# Patient Record
Sex: MALE | Race: BLACK OR AFRICAN AMERICAN | NOT HISPANIC OR LATINO | URBAN - METROPOLITAN AREA
[De-identification: names, ages, dates, MRNs, and addresses within clinical notes are randomized per-mention and may not be internally consistent; named-entity substitution may affect disease eponyms.]

---
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
## 2025-06-30 NOTE — ED PROVIDER NOTE - ENMT NEGATIVE STATEMENT, MLM
EGD INSTRUCTIONS     Re: [facility]   [address] [place] [address]    Provider: Howie Vidales MD    Your exam is scheduled as an outpatient procedure on:     Day: Tuesday    Date: August 12, 2025    Arrival Time: TO BE DETERMINED (You will receive a confirmation message 3 days before your appointment, if you do not receive a message or have questions, visit the patient portal for details.). Be aware your procedure time is subject to change.)     You will be receiving sedation for your procedure and MUST have an adult over 18 to drive you home and be with you after procedure.     Location: [address] [address]- Enter through the main entrance.           If you are diabetic: taking oral: Take all oral meds the day before the procedure. HOLD oral meds the day of the procedure.         7 days before: Review your instructions. (Instructions can also be found on [employer] under the letters tab under communication)      3 days before:Stop taking all anti-inflammatory medicines. These include: Advil, Aleve, Naprosyn, (Tylenol is okay to take)    1 day before:   Eat normal diet throughout the day   At MIDNIGHT start a strict clear liquid diet    A clear liquid diet can include: Apple juice, white grape juice, and white cranberry juice; Beef or chicken broths that are clear - no noodles, vegetables, rice, etc.Tea and coffee without milk; -Soda pop, Gatorade, Yonatan-Aid and various -Jell-O Flavors (any color except red or purple)  •Avoid: juices with pulp, milk, cream, solid food, alcohol, Gum, and hard candy.    -Remove ALL jewelry and piercings (rings, necklaces, all body piercings, etc) prior to arrival for procedure.      • Take your medications; levothyroxine (Synthroid) 100 MCG tablet, olmesartan (BENICAR) 20 MG tablet with a sip of water 4 hours prior to your arrival time. STOP ALL LIQUIDS INCLUDING WATER AT THIS TIME.                 Ears: no ear pain and no hearing problems. Nose: no nasal congestion and no nasal drainage. Mouth/Throat: no throat pain. Neck: no lumps, no pain, no stiffness and no swollen glands.